# Patient Record
Sex: FEMALE | URBAN - METROPOLITAN AREA
[De-identification: names, ages, dates, MRNs, and addresses within clinical notes are randomized per-mention and may not be internally consistent; named-entity substitution may affect disease eponyms.]

---

## 2022-12-22 ENCOUNTER — OFFICE VISIT (OUTPATIENT)
Dept: PRIMARY CARE CLINIC | Age: 27
End: 2022-12-22
Payer: COMMERCIAL

## 2022-12-22 VITALS
BODY MASS INDEX: 27.89 KG/M2 | RESPIRATION RATE: 12 BRPM | HEIGHT: 68 IN | DIASTOLIC BLOOD PRESSURE: 78 MMHG | WEIGHT: 184 LBS | TEMPERATURE: 98 F | OXYGEN SATURATION: 97 % | HEART RATE: 112 BPM | SYSTOLIC BLOOD PRESSURE: 106 MMHG

## 2022-12-22 DIAGNOSIS — Z00.00 ENCOUNTER FOR ANNUAL PHYSICAL EXAM: Primary | ICD-10-CM

## 2022-12-22 DIAGNOSIS — N92.0 MENORRHAGIA WITH REGULAR CYCLE: ICD-10-CM

## 2022-12-22 DIAGNOSIS — Z00.00 ENCOUNTER FOR ANNUAL PHYSICAL EXAM: ICD-10-CM

## 2022-12-22 DIAGNOSIS — F33.1 MODERATE EPISODE OF RECURRENT MAJOR DEPRESSIVE DISORDER (HCC): ICD-10-CM

## 2022-12-22 LAB
A/G RATIO: 1.4 (ref 1.1–2.2)
ALBUMIN SERPL-MCNC: 4 G/DL (ref 3.4–5)
ALP BLD-CCNC: 65 U/L (ref 40–129)
ALT SERPL-CCNC: 10 U/L (ref 10–40)
ANION GAP SERPL CALCULATED.3IONS-SCNC: 11 MMOL/L (ref 3–16)
AST SERPL-CCNC: 15 U/L (ref 15–37)
BASOPHILS ABSOLUTE: 0 K/UL (ref 0–0.2)
BASOPHILS RELATIVE PERCENT: 0.3 %
BILIRUB SERPL-MCNC: 0.3 MG/DL (ref 0–1)
BUN BLDV-MCNC: 12 MG/DL (ref 7–20)
CALCIUM SERPL-MCNC: 9.6 MG/DL (ref 8.3–10.6)
CHLORIDE BLD-SCNC: 101 MMOL/L (ref 99–110)
CHOLESTEROL, TOTAL: 160 MG/DL (ref 0–199)
CO2: 23 MMOL/L (ref 21–32)
CREAT SERPL-MCNC: 0.7 MG/DL (ref 0.6–1.1)
EOSINOPHILS ABSOLUTE: 0.1 K/UL (ref 0–0.6)
EOSINOPHILS RELATIVE PERCENT: 1 %
FERRITIN: 24.3 NG/ML (ref 15–150)
GFR SERPL CREATININE-BSD FRML MDRD: >60 ML/MIN/{1.73_M2}
GLUCOSE BLD-MCNC: 90 MG/DL (ref 70–99)
HCT VFR BLD CALC: 40.5 % (ref 36–48)
HDLC SERPL-MCNC: 43 MG/DL (ref 40–60)
HEMOGLOBIN: 13.5 G/DL (ref 12–16)
HEPATITIS C ANTIBODY INTERPRETATION: NORMAL
IRON SATURATION: 24 % (ref 15–50)
IRON: 70 UG/DL (ref 37–145)
LDL CHOLESTEROL CALCULATED: 102 MG/DL
LYMPHOCYTES ABSOLUTE: 1.8 K/UL (ref 1–5.1)
LYMPHOCYTES RELATIVE PERCENT: 23.7 %
MCH RBC QN AUTO: 29.3 PG (ref 26–34)
MCHC RBC AUTO-ENTMCNC: 33.4 G/DL (ref 31–36)
MCV RBC AUTO: 87.7 FL (ref 80–100)
MONOCYTES ABSOLUTE: 0.4 K/UL (ref 0–1.3)
MONOCYTES RELATIVE PERCENT: 6 %
NEUTROPHILS ABSOLUTE: 5.2 K/UL (ref 1.7–7.7)
NEUTROPHILS RELATIVE PERCENT: 69 %
PDW BLD-RTO: 13.2 % (ref 12.4–15.4)
PLATELET # BLD: 265 K/UL (ref 135–450)
PMV BLD AUTO: 8.8 FL (ref 5–10.5)
POTASSIUM SERPL-SCNC: 4 MMOL/L (ref 3.5–5.1)
RBC # BLD: 4.61 M/UL (ref 4–5.2)
SODIUM BLD-SCNC: 135 MMOL/L (ref 136–145)
T4 FREE: 1.1 NG/DL (ref 0.9–1.8)
TOTAL IRON BINDING CAPACITY: 290 UG/DL (ref 260–445)
TOTAL PROTEIN: 6.8 G/DL (ref 6.4–8.2)
TRIGL SERPL-MCNC: 75 MG/DL (ref 0–150)
TSH SERPL DL<=0.05 MIU/L-ACNC: 2.15 UIU/ML (ref 0.27–4.2)
VLDLC SERPL CALC-MCNC: 15 MG/DL
WBC # BLD: 7.5 K/UL (ref 4–11)

## 2022-12-22 PROCEDURE — 99385 PREV VISIT NEW AGE 18-39: CPT | Performed by: STUDENT IN AN ORGANIZED HEALTH CARE EDUCATION/TRAINING PROGRAM

## 2022-12-22 RX ORDER — TRAZODONE HYDROCHLORIDE 150 MG/1
150 TABLET ORAL NIGHTLY
Qty: 90 TABLET | Refills: 1 | Status: SHIPPED | OUTPATIENT
Start: 2022-12-22

## 2022-12-22 RX ORDER — ESCITALOPRAM OXALATE 10 MG/1
10 TABLET ORAL DAILY
Qty: 90 TABLET | Refills: 1 | Status: SHIPPED | OUTPATIENT
Start: 2022-12-22

## 2022-12-22 SDOH — ECONOMIC STABILITY: FOOD INSECURITY: WITHIN THE PAST 12 MONTHS, YOU WORRIED THAT YOUR FOOD WOULD RUN OUT BEFORE YOU GOT MONEY TO BUY MORE.: NEVER TRUE

## 2022-12-22 SDOH — ECONOMIC STABILITY: FOOD INSECURITY: WITHIN THE PAST 12 MONTHS, THE FOOD YOU BOUGHT JUST DIDN'T LAST AND YOU DIDN'T HAVE MONEY TO GET MORE.: NEVER TRUE

## 2022-12-22 ASSESSMENT — PATIENT HEALTH QUESTIONNAIRE - PHQ9
SUM OF ALL RESPONSES TO PHQ QUESTIONS 1-9: 2
1. LITTLE INTEREST OR PLEASURE IN DOING THINGS: 1
SUM OF ALL RESPONSES TO PHQ QUESTIONS 1-9: 2
2. FEELING DOWN, DEPRESSED OR HOPELESS: 1
SUM OF ALL RESPONSES TO PHQ9 QUESTIONS 1 & 2: 2
SUM OF ALL RESPONSES TO PHQ QUESTIONS 1-9: 2
SUM OF ALL RESPONSES TO PHQ QUESTIONS 1-9: 2

## 2022-12-22 ASSESSMENT — SOCIAL DETERMINANTS OF HEALTH (SDOH): HOW HARD IS IT FOR YOU TO PAY FOR THE VERY BASICS LIKE FOOD, HOUSING, MEDICAL CARE, AND HEATING?: NOT HARD AT ALL

## 2022-12-22 NOTE — PROGRESS NOTES
2022    Nuha Chilel (:  1995) is a 33 y.o. female, here for   Chief Complaint   Patient presents with    Anxiety    Depression     Fam History: silent stroke and stomach cancer mother    Anxiety and depression  - started in highschool  - has had suicide attempts last one was   - no alcohol or drugs  - last hospitalized 3 weeks ago for depression  - work and family stress, she is the eldest and has a lot of responsibility, feels like she is , bt mom and dad who are , not a great relationship with little sister, mom tells her she is not doing enough for the family    Has therapist goes one a week  Recently got out of hospital      Feels emotionless, and having problem with \"reality\"  feels like she is here but not really, thought she was dreaming, depressive episodes will last for hours to 3 days, will sleep ok but was over sleeping, lexapro has helped her give her more energy, feels fatigued all the time, no SHORTNESS OF BREATH    Mood today kind of feels on edge     Has nexplanon but having heavy periods     There is no problem list on file for this patient. Review of Systems   All other systems reviewed and are negative. Prior to Visit Medications    Medication Sig Taking? Authorizing Provider   escitalopram (LEXAPRO) 10 MG tablet Take 1 tablet by mouth daily Yes Anat Soto MD   traZODone (DESYREL) 150 MG tablet Take 1 tablet by mouth nightly Yes Anat Soto MD        No Known Allergies    No past medical history on file.     Past Surgical History:   Procedure Laterality Date    ANTERIOR CRUCIATE LIGAMENT REPAIR Bilateral        Social History     Socioeconomic History    Marital status: Single     Spouse name: Not on file    Number of children: Not on file    Years of education: Not on file    Highest education level: Not on file   Occupational History    Not on file   Tobacco Use    Smoking status: Never     Passive exposure: Never    Smokeless tobacco: Never   Substance and Sexual Activity    Alcohol use: Never    Drug use: Never    Sexual activity: Not on file   Other Topics Concern    Not on file   Social History Narrative    Not on file     Social Determinants of Health     Financial Resource Strain: Low Risk     Difficulty of Paying Living Expenses: Not hard at all   Food Insecurity: No Food Insecurity    Worried About Running Out of Food in the Last Year: Never true    Ran Out of Food in the Last Year: Never true   Transportation Needs: Not on file   Physical Activity: Not on file   Stress: Not on file   Social Connections: Not on file   Intimate Partner Violence: Not on file   Housing Stability: Not on file        Family History   Problem Relation Age of Onset    Stroke Mother        ADVANCE DIRECTIVE: N, <no information>    Vitals:    12/22/22 0814   BP: 106/78   Site: Left Upper Arm   Position: Sitting   Cuff Size: Small Adult   Pulse: (!) 112   Resp: 12   Temp: 98 °F (36.7 °C)   TempSrc: Temporal   SpO2: 97%   Weight: 184 lb (83.5 kg)   Height: 5' 8\" (1.727 m)     Estimated body mass index is 27.98 kg/m² as calculated from the following:    Height as of this encounter: 5' 8\" (1.727 m). Weight as of this encounter: 184 lb (83.5 kg). Physical Exam  Vitals reviewed. Constitutional:       General: She is not in acute distress. Appearance: Normal appearance. She is not ill-appearing. HENT:      Head: Normocephalic and atraumatic. Right Ear: Tympanic membrane, ear canal and external ear normal.      Left Ear: Tympanic membrane, ear canal and external ear normal.      Nose: Nose normal. No rhinorrhea. Mouth/Throat:      Mouth: Mucous membranes are moist.      Pharynx: Oropharynx is clear. No oropharyngeal exudate. Eyes:      General: No scleral icterus. Right eye: No discharge. Left eye: No discharge. Extraocular Movements: Extraocular movements intact.       Conjunctiva/sclera: Conjunctivae normal.   Cardiovascular: Rate and Rhythm: Normal rate and regular rhythm. Pulses: Normal pulses. Heart sounds: Normal heart sounds. No murmur heard. No gallop. Pulmonary:      Effort: Pulmonary effort is normal.      Breath sounds: Normal breath sounds. No wheezing, rhonchi or rales. Abdominal:      General: Abdomen is flat. Bowel sounds are normal. There is no distension. Palpations: Abdomen is soft. There is no mass. Musculoskeletal:         General: Normal range of motion. Cervical back: Neck supple. No muscular tenderness. Lymphadenopathy:      Cervical: No cervical adenopathy. Skin:     General: Skin is warm. Capillary Refill: Capillary refill takes less than 2 seconds. Findings: No rash. Neurological:      General: No focal deficit present. Mental Status: She is alert. Cranial Nerves: No cranial nerve deficit. Psychiatric:         Mood and Affect: Mood normal.         Behavior: Behavior normal.       No flowsheet data found. No results found for: CHOL, CHOLFAST, TRIG, TRIGLYCFAST, HDL, LDLCHOLESTEROL, LDLCALC, GLUF, GLUCOSE, LABA1C    The ASCVD Risk score (Gregorio DK, et al., 2019) failed to calculate for the following reasons:     The 2019 ASCVD risk score is only valid for ages 36 to 78    Immunization History   Administered Date(s) Administered    COVID-19, J&J, (age 18y+), IM, 0.5 mL 04/02/2021    Influenza, FLUARIX, FLULAVAL, FLUZONE (age 10 mo+) AND AFLURIA, (age 1 y+), PF, 0.5mL 03/22/2019, 01/18/2022    Influenza, Triv, 3 Years and older, IM (Afluria (5 yrs and older) 11/29/2022       Health Maintenance   Topic Date Due    Varicella vaccine (1 of 2 - 2-dose childhood series) Never done    HPV vaccine (1 - 2-dose series) Never done    Depression Screen  Never done    HIV screen  Never done    Hepatitis C screen  Never done    DTaP/Tdap/Td vaccine (1 - Tdap) Never done    Pap smear  Never done    COVID-19 Vaccine (2 - Booster for Marilin series) 05/28/2021    Flu vaccine  Completed    Hepatitis A vaccine  Aged Out    Hib vaccine  Aged Out    Meningococcal (ACWY) vaccine  Aged Out    Pneumococcal 0-64 years Vaccine  Aged Out       Assessment & Plan   Encounter for annual physical exam  -     CBC with Auto Differential; Future  -     Comprehensive Metabolic Panel; Future  -     Lipid Panel; Future  -     HIV Screen; Future  -     TSH; Future  -     T4, Free; Future  -     Ferritin; Future  -     Iron and TIBC; Future  -     Hepatitis C Antibody; Future  -     [facility]  General wellness exam. Reviewed chart for past hx and updated today. Counseled on age appropriate health guidance and discussed screening recommendations. Vaccinations reviewed and discussed. All questions answered    Moderate episode of recurrent major depressive disorder ([address]  Cont with therapy   Refill lexapro and trazodone  F/y psychiatry  -     [facility]  Menorrhagia with regular cycle  -     CBC with Auto Differential; Future  -     TSH; Future  -     T4, Free; Future  -     Ferritin; Future  -     Iron and TIBC; Future  -     Kurt Vazquez MD, Gynecology, [facility]  Has nexplanon but interested in mirena  Likely has JUNIOR which can make depression worse    Return in about 6 months (around 6/22/2023), or if symptoms worsen or fail to improve.          --Aubree Condon MD

## 2022-12-23 ENCOUNTER — CARE COORDINATION (OUTPATIENT)
Dept: OTHER | Facility: CLINIC | Age: 27
End: 2022-12-23

## 2022-12-23 LAB
HIV AG/AB: NORMAL
HIV ANTIGEN: NORMAL
HIV-1 ANTIBODY: NORMAL
HIV-2 AB: NORMAL

## 2022-12-23 NOTE — CARE COORDINATION
[address] Care Transitions Follow Up Call    Care Transition Nurse contacted the patient by telephone to follow up after admission on 22. Verified name and  with patient as identifiers. Patient: Juventino Velasco  Patient : 1995   MRN: [id number]  Reason for Admission: Behavioral health  Discharge Date:   RARS: No data recorded    Needs to be reviewed by the provider   Additional needs identified to be addressed with provider: No  none             Method of communication with provider: none. Addressed changes since last contact:  none  Discussed follow-up appointments. If no appointment was previously scheduled, appointment scheduling offered: Yes. Is follow up appointment scheduled within 7 days of discharge? No.  Patient had a new patient follow up for PCP on . She was referred for psychiatric follow up by her PCP to [facility]. This ACM verified in network status. Patient will call and schedule an appointment at her earliest convenience. Patient has been seeing a therapist for 3 weeks, Breann Hicks at [facility]. She is seeing him weekly and has her next appointment on . Follow Up  No future appointments. Non-[facility] follow up appointment(s): None    Care Transition Nurse reviewed discharge instructions with patient and discussed any barriers to care and/or understanding of plan of care after discharge. Discussed appropriate site of care based on symptoms and resources available to patient including: Specialist. The patient agrees to contact the PCP office for questions related to their healthcare. Advance Care Planning:   not on file; education provided.      Patients top risk factors for readmission: depression and ineffective coping  Interventions to address risk factors: Obtained and reviewed discharge summary and/or continuity of care documents    Offered patient enrollment in the Remote Patient Monitoring (RPM) program for in-home monitoring: Patient is not eligible for RPM program.     Care Transitions Subsequent and Final Call    Subsequent and Final Calls  Care Transitions Interventions  Other Interventions:             Care Transition Nurse provided contact information for future needs. Plan for follow-up call in 10-14 days based on severity of symptoms and risk factors. Plan for next call: follow-up appointment-will discuss upcoming scheduled appointment with a psychiatrist at [facility].      Carley Wade RN

## 2023-01-04 ENCOUNTER — TELEPHONE (OUTPATIENT)
Dept: PRIMARY CARE CLINIC | Age: 28
End: 2023-01-04

## 2023-01-04 NOTE — TELEPHONE ENCOUNTER
Patient called and enquired about the fax received from [employer] which has been sent     To Dr Whitney Baires today.     Please review    Her call back lf--[phone]    Thanks

## 2023-01-04 NOTE — TELEPHONE ENCOUNTER
Lmtcb please inform patient all the paperwork that was faxed to us was incomplete and we need pages re faxed tried to contact [employer] and never got through

## 2023-01-06 ENCOUNTER — CARE COORDINATION (OUTPATIENT)
Dept: OTHER | Facility: CLINIC | Age: 28
End: 2023-01-06

## 2023-01-06 NOTE — CARE COORDINATION
ACM attempted to reach patient for Care Transition follow up and introduction to Associate Care Management. HIPAA compliant message left requesting a return phone call. Will attempt to outreach patient again.

## 2023-01-12 ENCOUNTER — CARE COORDINATION (OUTPATIENT)
Dept: OTHER | Facility: CLINIC | Age: 28
End: 2023-01-12

## 2023-01-12 NOTE — CARE COORDINATION
Ambulatory Care Coordination Note  1/12/2023    Ambulatory Care Management Note    Date/Time:  1/12/2023 4:16 PM    This patient was received as a referral from Daily assignment. Ambulatory Care Manager outreached to patient today to offer care management services. Introduction to self and role of care manager provided. Patient accepted care management services at this time. Follow up call scheduled at this time. Patient has Ambulatory Care Manager's contact number for for any questions or concerns. Patient states that she is still struggling a bit with her emotions, feeling numb often. She states that she is still anxious and worried about her STD coming through. She is back at work, but needing back pay for the time she was out. Patient states that she has been taking Lexapro for about 6 weeks, not seeing a great deal of improvement. Her PCP referred to a psychiatrist at [facility], however due to her work schedule, she has been unable to get in for an appointment. She has received enough refills from her PCP to last until she is able to get an appointment. She struggles to get an appointment because she has no PTO to use. This ACM suggested trying [facility], to see if she could get the medication management, at least until she is able to build up some time. She sees Thereharlan Little for therapy and would like to continue seeing him. Her last appointment was two weeks, she plans to attempt to schedule something soon. This ACM encouraged her to have [employer] send her papers directly to her and then for her to send them to the 's office, as she is being told that they are getting lost.   Will send the patient information regarding [facility], via email at her request. Will continue to follow the patient.      Ambulatory Care Coordination Assessment    Care Coordination Protocol  Referral from Primary Care Provider: No  Week 1 - Initial Assessment     Do you have all of your prescriptions and are they filled?: Yes  Are you able to afford your medications?: Yes  How often do you have trouble taking your medications the way you have been told to take them?: I always take them as prescribed. Current Housing: Private Residence                 Suggested Interventions and Community Resources                  Prior to Admission medications    Medication Sig Start Date End Date Taking? Authorizing Provider   escitalopram (LEXAPRO) 10 MG tablet Take 1 tablet by mouth daily 12/22/22   Debbie Mota MD   traZODone (DESYREL) 150 MG tablet Take 1 tablet by mouth nightly 12/22/22   Debbie Mota MD       No future appointments.

## 2023-01-16 ENCOUNTER — TELEMEDICINE (OUTPATIENT)
Dept: PRIMARY CARE CLINIC | Age: 28
End: 2023-01-16
Payer: COMMERCIAL

## 2023-01-16 DIAGNOSIS — F41.8 DEPRESSION WITH ANXIETY: Primary | ICD-10-CM

## 2023-01-16 PROCEDURE — 99214 OFFICE O/P EST MOD 30 MIN: CPT | Performed by: STUDENT IN AN ORGANIZED HEALTH CARE EDUCATION/TRAINING PROGRAM

## 2023-01-16 RX ORDER — ESCITALOPRAM OXALATE 20 MG/1
20 TABLET ORAL DAILY
Qty: 90 TABLET | Refills: 3 | Status: SHIPPED | OUTPATIENT
Start: 2023-01-16

## 2023-01-16 NOTE — PROGRESS NOTES
Peace Solis (:  1995) is a Established patient, here for evaluation of the following:    Assessment & Plan   Below is the assessment and plan developed based on review of pertinent history, physical exam, labs, studies, and medications. 1. Depression with anxiety  -     escitalopram (LEXAPRO) 20 MG tablet; Take 1 tablet by mouth daily, Disp-90 tablet, R-3Normal    Depression anxiety not well controlled, because of not being able to take off of work for her appointments she has not yet scheduled another therapy session. I do believe that it is imperative for her to be able to see her therapist at least once weekly to better treat her anxiety and depression, improve her overall wellbeing, and prevent another admission into the hospital.  After starting Lexapro her mood has improved but still having a lot of panic attacks. - Increase Lexapro to 20 mg daily follow-up in 1 month  - I filled out her paperwork for accommodations so that she may be able to go to her therapy sessions and follow-up visits with me. See media    Return in about 4 weeks (around 2023), or if symptoms worsen or fail to improve. Subjective   HPI    Was in hospital, see clinic note form 22 for depression    Depression is better but feels she is still \"on edge\" and feeling numb as well. Going to therapy once a week 1 hour visits, so far is going well, will feel anxious all of a sudden out of nowhere and will have panic attacks, somewhat better after starting lexapro     Review of Systems   All other systems reviewed and are negative. Objective   Patient-Reported Vitals  No data recorded     Physical Exam  Vitals reviewed. Constitutional:       General: She is not in acute distress. Appearance: Normal appearance. She is not ill-appearing. HENT:      Head: Normocephalic and atraumatic. Right Ear: External ear normal.      Left Ear: External ear normal.   Eyes:      General: No scleral icterus. Right eye: No discharge. Left eye: No discharge. Extraocular Movements: Extraocular movements intact. Conjunctiva/sclera: Conjunctivae normal.   Pulmonary:      Effort: Pulmonary effort is normal. No respiratory distress. Musculoskeletal:      Cervical back: Neck supple. Skin:     Coloration: Skin is not jaundiced. Findings: No lesion or rash. Neurological:      Mental Status: She is alert. Cranial Nerves: No cranial nerve deficit. Coordination: Coordination normal.   Psychiatric:         Mood and Affect: Mood normal.                Ese Torres, was evaluated through a synchronous (real-time) audio-video encounter. The patient (or guardian if applicable) is aware that this is a billable service, which includes applicable co-pays. This Virtual Visit was conducted with patient's (and/or legal guardian's) consent. The visit was conducted pursuant to the emergency declaration under the [facility], [facility] authority and the [employer] General Act. Patient identification was verified, and a caregiver was present when appropriate. The patient was located at Home: [address] [place] [address]. Provider was located at [facility] (Appt Dept): [address]  [facility].         --Meghan Fagan MD

## 2023-01-24 ENCOUNTER — CARE COORDINATION (OUTPATIENT)
Dept: OTHER | Facility: CLINIC | Age: 28
End: 2023-01-24

## 2023-01-24 NOTE — CARE COORDINATION
Ambulatory Care Coordination Note  1/24/2023    Telephonic outreach to follow up with the patient. Verified two patient identifiers. Patient had a recent birthday, states that she had a nice dinner with her boyfriend. She is pleased that her concerns regarding her leave have been moving forward. She is waiting to get approval for intermittent FMLA and final STD with [employer].     patient reports the following symptoms of anxiety: chest pain, racing thoughts, and feelings of apprehension/worry. Symptoms are reported as has moderately improved since previous ACM contact. Is patient currently undergoing treatment? Yes Patient plans to start therapy weekly again    Patient states that she has better control of her emotions. She had mentioned in a previous conversation that she was feeling numb. She was pleased that she was able to cry for the first time in a while. [facility] preformed the following medication review: All medications are filled Yes Patient recently had her Lexapro increased from 10mg to 20mg. She is having a few side effects since the increase, to include: GI upset, slight H/A, and sweating at night while sleeping. Discussed how these symptoms should subside as her body gets used to the medication. Encouraged her to notify her provider if the symptoms do not improve in the next couple of weeks. Patient feels like the medication has been effective for her moods and her anxiety. She states that at night it is hard to shut her brain off, however taking the Trazadone has been very helpful. Will continue to outreach the patient and offer assistance as needed. Lab Results       None            Care Coordination Interventions    Referral from Primary Care Provider: No  Suggested Interventions and Community Resources          Goals Addressed    None         Prior to Admission medications    Medication Sig Start Date End Date Taking?  Authorizing Provider   escitalopram (LEXAPRO) 20 MG tablet Take 1 tablet by mouth daily 1/16/23   Dianne Covarrubias MD   traZODone (DESYREL) 150 MG tablet Take 1 tablet by mouth nightly 12/22/22   Dianne Covarrubias MD       No future appointments.

## 2023-02-10 ENCOUNTER — CARE COORDINATION (OUTPATIENT)
Dept: OTHER | Facility: CLINIC | Age: 28
End: 2023-02-10

## 2023-02-20 ENCOUNTER — TELEPHONE (OUTPATIENT)
Dept: GYNECOLOGY | Age: 28
End: 2023-02-20

## 2023-02-20 ENCOUNTER — OFFICE VISIT (OUTPATIENT)
Dept: GYNECOLOGY | Age: 28
End: 2023-02-20
Payer: COMMERCIAL

## 2023-02-20 VITALS
OXYGEN SATURATION: 97 % | HEART RATE: 78 BPM | DIASTOLIC BLOOD PRESSURE: 74 MMHG | RESPIRATION RATE: 12 BRPM | TEMPERATURE: 97.6 F | SYSTOLIC BLOOD PRESSURE: 122 MMHG | WEIGHT: 177.4 LBS | BODY MASS INDEX: 26.97 KG/M2

## 2023-02-20 DIAGNOSIS — Z01.419 WELL WOMAN EXAM WITH ROUTINE GYNECOLOGICAL EXAM: Primary | ICD-10-CM

## 2023-02-20 DIAGNOSIS — Z11.3 SCREEN FOR STD (SEXUALLY TRANSMITTED DISEASE): ICD-10-CM

## 2023-02-20 DIAGNOSIS — N92.0 MENORRHAGIA WITH REGULAR CYCLE: ICD-10-CM

## 2023-02-20 PROCEDURE — 99385 PREV VISIT NEW AGE 18-39: CPT | Performed by: OBSTETRICS & GYNECOLOGY

## 2023-02-20 RX ORDER — PROMETHAZINE HYDROCHLORIDE 25 MG/1
TABLET ORAL
Qty: 2 TABLET | Refills: 0 | Status: SHIPPED | OUTPATIENT
Start: 2023-02-20

## 2023-02-20 RX ORDER — OXYCODONE HYDROCHLORIDE AND ACETAMINOPHEN 5; 325 MG/1; MG/1
2 TABLET ORAL ONCE
Qty: 2 TABLET | Refills: 0 | Status: SHIPPED | OUTPATIENT
Start: 2023-02-20 | End: 2023-02-20

## 2023-02-20 RX ORDER — DIAZEPAM 10 MG/1
TABLET ORAL
Qty: 1 TABLET | Refills: 0 | Status: SHIPPED | OUTPATIENT
Start: 2023-02-20 | End: 2023-02-20

## 2023-02-20 NOTE — LETTER
[facility]  [place] [id number] [place] [address]  Phone: [phone]  Fax: [phone]           Brandon Espinoza MD      2023     Patient: Rayna Tirado   MR Number: [id number]   YOB: 1995   Date of Visit: 2023       Dear Dr. Jazmine Sue: Thank you for referring Rayna Tirdao to me for evaluation/treatment. Below are the relevant portions of my assessment and plan of care. Mike Linares was seen today for well woman gynecologic care and Pap smear. STI testing was also sent. The patient has a  Nexplanon. She would like a Mirena IUD. We are ordering this for the patient and will get it placed once it arrives. She will also be provided with oral sedation. If you have questions, please do not hesitate to call me. I look forward to following Mike Linares along with you.     Sincerely,        Brandon Espinoza MD    CC providers:  Lennie Sutherland MD  [facility] [address] [facility] [address]  [address] [employer]

## 2023-02-20 NOTE — PROGRESS NOTES
SUBJECTIVE:  Nuha Chilel is an 33y.o. year old woman who presents for annual gyn exam.    The patient has a Nexplanon in place. It was placed in 2019. This is her second when having had the first 1 placed in 2016. She would like a IUD. She complains of heavy menorrhagia with Nexplanon. IUD options reviewed and wants a Mirena IUD. Discussed risk of use and insertion. Acceptable to patient. Mirena IUD is ordered for her. We will go ahead and take out her Nexplanon in place for Mirena IUD at the same day. Patient does note that she has some anxiety and we discussed premedication/oral sedation. She is aware that someone will need to drive her to and from the appointment. The patient has not had a annual exam/Pap smear or infection checks in several years. She has a history of chlamydia. She is offered and accepted Pap smear and STI testing. See orders. REVIEW OF SYSTEMS:  No complaints of symptoms involving:  Constitutional: there has been no unanticipated weight loss. There's been no change in activity level. Negative for fever, chills. Eyes: No visual changes, double vision, or scotomata. No scleral icterus. HENT: No Headaches, hearing loss or vertigo. No sore throat, ear pain or nasal congestion  Respiratory: no cough or wheezing, no sputum production, no hemoptysis. Gastrointestinal: No abdominal pain, appetite loss, blood in stools. No change in bowel habits. Genitourinary: No dysuria, trouble voiding, or hematuria. No change in bladder habits. Musculoskeletal:  No gait disturbance,no weakness or joint complaints. Skin: No rash or pruritis. Neurological: No headache, vision changes, change in muscle strength, numbness or tingling. No change in gait, balance, coordination. Psychiatric: No anxiety, or depression. No change in mood or behavior. Endocrine: No temperature intolerance. No excessive thirst, fluid intake, or urination. No tremor.   Hematologic/Lymphatic: No abnormal bruising or bleeding, blood clots or swollen lymph nodes. There is no problem list on file for this patient. Current Outpatient Medications   Medication Sig Dispense Refill    escitalopram (LEXAPRO) 20 MG tablet Take 1 tablet by mouth daily 90 tablet 3    traZODone (DESYREL) 150 MG tablet Take 1 tablet by mouth nightly 90 tablet 1     No current facility-administered medications for this visit. Past Medical History:   Diagnosis Date    Chlamydia 2019    Nexplanon in place 2019 2016 and 2019     Past Surgical History:   Procedure Laterality Date    ANTERIOR CRUCIATE LIGAMENT REPAIR Bilateral      Social History     Tobacco Use    Smoking status: Never     Passive exposure: Never    Smokeless tobacco: Never   Substance Use Topics    Alcohol use: Never     OB History    No obstetric history on file. Obstetric Comments   G0             Family History   Problem Relation Age of Onset    Stroke Mother        OBJECTIVE:  /74   Pulse 78   Temp 97.6 °F (36.4 °C) (Temporal)   Resp 12   Wt 177 lb 6.4 oz (80.5 kg)   LMP  (Exact Date)   SpO2 97%   BMI 26.97 kg/m²   Body mass index is 26.97 kg/m². General appearance: alert,calm,pleasant, no acute distress, non-toxic  Skin:  no lesions,no rashes  Neck: no thyromegaly,no adenopathy,no masses  Abdominal: Abdomen soft, non-tender. No rebound or guarding. No masses, organomegaly  Breasts: Inspection negative. No skin changes such as dipples, edema, or retractions. No nipple discharge or bleeding. No mass palpated. Non tender. No supraclavicular, infraclavicular, or axillary lymphadenopathy  Genitourinary:  Vulva:  no external lesions,normal hair distribution,no inguinal adenopathy  Vagina:  moist,pink,well rugated,no discharge  Bladder without mass, nontender. Urethra, and Urethral meatus grossly normal without mass and nontender  Cervix:  smooth,pink,no lesions.   Uterus:  normal size, shape and consistency,mobile,nontender  Adnexa:  no masses,no tenderness  Rectum/anus:  no external hemorrhoids,no lesions  History and Examination chaperoned by Medical Assistant    ASSESSMENT AND PLAN:   Diagnosis Orders   1. Well woman exam with routine gynecological exam  PAP SMEAR    C.trachomatis N.gonorrhoeae DNA, Thin Prep    VAGINAL PATHOGENS PROBE *A      2. Screen for STD (sexually transmitted disease)  C.trachomatis N.gonorrhoeae DNA, Thin Prep    VAGINAL PATHOGENS PROBE *A      3. Menorrhagia with regular cycle  oxyCODONE-acetaminophen (PERCOCET) 5-325 MG per tablet    diazePAM (VALIUM) 10 MG tablet        Follow-up for Mirena IUD insertion and Nexplanon removal.  Oral sedation for procedures.     SBE monthly and return annually or prn  Explained current pap smear guidelines  Patient counseling:  SBE demonstrated

## 2023-02-20 NOTE — TELEPHONE ENCOUNTER
IUD Mirena order sent to [facility]. Waiting on order to be shipped    Phone: [phone]  Fax: [phone]

## 2023-02-21 LAB
REASON FOR REJECTION: NORMAL
REJECTED TEST: NORMAL

## 2023-02-22 LAB
HPV COMMENT: NORMAL
HPV TYPE 16: NOT DETECTED
HPV TYPE 18: NOT DETECTED
HPVOH (OTHER TYPES): NOT DETECTED

## 2023-02-23 LAB
C. TRACHOMATIS DNA,THIN PREP: NEGATIVE
N. GONORRHOEAE DNA, THIN PREP: NEGATIVE

## 2023-03-01 ENCOUNTER — PROCEDURE VISIT (OUTPATIENT)
Dept: GYNECOLOGY | Age: 28
End: 2023-03-01
Payer: COMMERCIAL

## 2023-03-01 VITALS
WEIGHT: 176 LBS | OXYGEN SATURATION: 99 % | DIASTOLIC BLOOD PRESSURE: 72 MMHG | SYSTOLIC BLOOD PRESSURE: 118 MMHG | HEART RATE: 102 BPM | BODY MASS INDEX: 26.76 KG/M2

## 2023-03-01 DIAGNOSIS — Z30.430 ENCOUNTER FOR INSERTION OF INTRAUTERINE CONTRACEPTIVE DEVICE (IUD): Primary | ICD-10-CM

## 2023-03-01 PROCEDURE — 58300 INSERT INTRAUTERINE DEVICE: CPT | Performed by: OBSTETRICS & GYNECOLOGY

## 2023-03-01 NOTE — PROGRESS NOTES
Rogelio Loredo is here today for her Nexplanon removal.  See previous notes.  Palpation of the Nexplanon device is very limited secondary to the device being deep.  With pressure on the proximal end of the device, I am able to feel the distal tip deep in the soft tissue.  The area covering the tip of the distal end is also difficult to palpate secondary to dark shading tattoo over this area changing the texture of the skin.  Because of the deep localization of the device, I discussed removing this under ultrasound guidance.  The patient is agreeable and arrangements will be made.    Rogelio Loredo is a 28 y.o. female here today for Mirena intrauterine device (IUD) insertion.  She has been previously counseled.  I again counseled her. Warnings and instructions were given including risks of insertion, including perforation, infection and need for surgery.  The patient is also aware of the risk of IUD failure.  She was educated regarding checking her strings, avoidance of sexually transmitted diseases (STDs), and the anticipated bleeding profile after insertion.  She is able to voice understanding, consents to proceed with IUD insertion.    Review of systems:  No complaints of symptoms involving:  Constitutional: negative for fever, chills.  Eyes: No change in vision, double vision, or scotomata.  HENT: No sore throat, ear pain or nasal congestion.  Respiratory: No cough, shortness of breath or hemoptysis.  Cardiovascular: No chest pain, orthopnea, fainting.  Skin: No pruritus or generalized rash.  Neurologic: No focal weakness or sensory changes.   Gynecologic: regular periods    There is no problem list on file for this patient.    Past Medical History:   Diagnosis Date    Chlamydia 2019    Nexplanon in place 2019 2016 and 2019     Past Surgical History:   Procedure Laterality Date    ANTERIOR CRUCIATE LIGAMENT REPAIR Bilateral      OB History    No obstetric history on file.      Obstetric Comments   G0          No Known Allergies  Current Outpatient Medications   Medication Sig Dispense Refill    promethazine (PHENERGAN) 25 MG tablet Take 2 tablets 60 minutes before procedure. 2 tablet 0    escitalopram (LEXAPRO) 20 MG tablet Take 1 tablet by mouth daily 90 tablet 3    traZODone (DESYREL) 150 MG tablet Take 1 tablet by mouth nightly 90 tablet 1     No current facility-administered medications for this visit. Social History     Socioeconomic History    Marital status: Single     Spouse name: Not on file    Number of children: Not on file    Years of education: Not on file    Highest education level: Not on file   Occupational History    Not on file   Tobacco Use    Smoking status: Never     Passive exposure: Never    Smokeless tobacco: Never   Substance and Sexual Activity    Alcohol use: Never    Drug use: Never    Sexual activity: Not on file   Other Topics Concern    Not on file   Social History Narrative    Not on file     Social Determinants of Health     Financial Resource Strain: Low Risk     Difficulty of Paying Living Expenses: Not hard at all   Food Insecurity: No Food Insecurity    Worried About Running Out of Food in the Last Year: Never true    Ran Out of Food in the Last Year: Never true   Transportation Needs: Not on file   Physical Activity: Not on file   Stress: Not on file   Social Connections: Not on file   Intimate Partner Violence: Not on file   Housing Stability: Not on file     Family History   Problem Relation Age of Onset    Stroke Mother      OBJECTIVE:  /72 (Site: Left Upper Arm, Position: Sitting, Cuff Size: Medium Adult)   Pulse (!) 102   Wt 176 lb (79.8 kg)   SpO2 99%   BMI 26.76 kg/m²   No LMP recorded. Patient has had an implant. General appearance: alert, no acute distress, non-toxic, normal respiratory effort  HEENT: Sclera are clear and non icteric. MOOD and AFFECT: Appropriate,calm. JUDGEMENT and INSIGHT: Intact.   Skin:  no lesions,no rashes  Abdominal: Abdomen soft, non-tender. No masses, organomegaly, hernia  Vulva:  no external lesions,normal hair distribution,no inguinal adenopathy  Vagina:  moist,pink,well rugated,no discharge  Bladder, Urethra, and Urethral meatus grossly normal without mass, nontender. Cervix:  parous,smooth,pink,no lesions. Uterus:  normal size, shape and consistency,mobile,nontender  Adnexa:  no masses,no tenderness  Rectum:  no external hemorrhoids,no lesions  Urine pregnancy test is negative. PROCEDURE:  Pregnancy has been excluded and there are no contraindications to the use of Mirena. We discussed and she understands the contents of the Patient Information Booklet and a signed patient informed consent has been obtained. With the patient comfortably in lithotomy position, a bimanual exam was performed to establish the size, shape and position of the uterus. See above. A speculum was gently inserted into the vagina to visualize the cervix. The cervix and vagina was gently cleansed with antiseptic solution. The anterior lip of the cervix was grasped with a Allis forceps and gently traction was applied to stabilize and align the cervical canal with the uterine cavity. Gentle traction on the cervix was maintained throughout the insertion procedure. A uterine sound was gently inserted to check the patency of the cervix, measure the depth of the uterine cavity in centimeters, confirm cavity direction, and detect evaluate for the presence of uterine anomaly. The Mirena IUD packaging is opened. The handle of the sterile  is remove from the sterile package. The Mirena IUD is loaded into the insertion tube by pushing the slider forward as far as possible in the direction of the arrow thereby moving the insertion tube over the Mirena T-body to load Mirena into the insertion tube. The tips of the arms meet to form a rounded end that extends slightly beyond the insertion tube with its Mirena IUD device loaded in the insertion tube. Holding the slider in this forward position, the upper edge of the flange is moved to correspond to the uterine depth (in centimeters) measured during sounding. Continuing to hold the slider in the forward position, the  is advanced through the cervix until the flange is approximately 1.5-2 cm from the cervix and then insertion is paused. While holding the  steady, the slider is moved down to the mariama to release the arms of Mirena. Ten seconds are allowed to elapse for the horizontal arms to open completely. Once the Mirena IUD arms are allowed to open,  the  is advanced gently towards the fundus of the uterus until the flange touches the cervix. With the Mirena is now in the fundal position, the Mirena is released by moving the slider all the way down. While continuing to hold the slider all the way down the  is slowly and gently withdraw from the uterus. Using a sharp, curved scissor, the threads are perpendicular, leaving about 3 cm visible outside of the cervix. A copy of the Consent Form with lot number is kept for the patient's medical record. The uterus sounded to approximately 7-8 cm. No complications were experienced and the patient was educated regarding checking her strings. She will follow up with me in one week for a post-IUD insertion check or for p.r.n. Reasons. History/patient education and Examination chaperoned by Medical Assistant     Diagnosis Orders   1. Encounter for insertion of intrauterine contraceptive device (IUD)  87569 - VT INSERT INTRAUTERINE DEVICE        We will arrange for Nexplanon removal with ultrasound guidance.

## 2023-03-07 ENCOUNTER — CARE COORDINATION (OUTPATIENT)
Dept: OTHER | Facility: CLINIC | Age: 28
End: 2023-03-07

## 2023-03-07 ENCOUNTER — TELEPHONE (OUTPATIENT)
Dept: GYNECOLOGY | Age: 28
End: 2023-03-07

## 2023-03-07 NOTE — CARE COORDINATION
Ambulatory Care Coordination Note  3/7/2023    Called pt to follow up on progress, reinforce previous education/ provide pt education, and discuss any new issues or concerns. HIPAA compliant message left requesting a return phone call at patients convenience to discuss. Will continue to follow.        Lab Results       None            Care Coordination Interventions    Referral from Primary Care Provider: No  Suggested Interventions and Community Resources          Goals Addressed    None         Future Appointments   Date Time Provider Daniela Aguirre   3/22/2023  8:30 AM MD [facility]

## 2023-03-07 NOTE — TELEPHONE ENCOUNTER
Nexplanon removal needed to be scheduled in the OR room per managment. Scheduled for 03/23/2023. Called insurance company to speak to a rep about getting procedure covered as using an OR for a Nexplanon removal is unusual and I wanted to make sure all bases where covered. Rep informs me that her insurance was deactivated on 03/01/2023. If pt calls back please send her to me. Thank you!

## 2023-03-27 ENCOUNTER — CARE COORDINATION (OUTPATIENT)
Dept: OTHER | Facility: CLINIC | Age: 28
End: 2023-03-27
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
## 2023-08-28 ENCOUNTER — CARE COORDINATION (OUTPATIENT)
Dept: OTHER | Facility: CLINIC | Age: 28
End: 2023-08-28

## 2023-09-01 ENCOUNTER — CARE COORDINATION (OUTPATIENT)
Dept: OTHER | Facility: CLINIC | Age: 28
End: 2023-09-01

## 2023-09-01 NOTE — CARE COORDINATION
Ambulatory Care Coordination Note    ACM attempted to reach patient for care management follow up call regarding [facility] concerns. HIPAA compliant message left requesting a return phone call at patient convenience. Plan for follow-up call in 7-10 days    No future appointments.

## 2023-09-12 ENCOUNTER — CARE COORDINATION (OUTPATIENT)
Dept: OTHER | Facility: CLINIC | Age: 28
End: 2023-09-12

## 2023-09-12 NOTE — CARE COORDINATION
Ambulatory Care Coordination Note    ACM attempted to reach patient for care management follow up call regarding CCM. HIPAA compliant message left requesting a return phone call at patient convenience. Plan for follow-up call in 10-14 days    No future appointments.

## 2023-09-26 ENCOUNTER — CARE COORDINATION (OUTPATIENT)
Dept: OTHER | Facility: CLINIC | Age: 28
End: 2023-09-26

## 2023-09-26 NOTE — CARE COORDINATION
Ambulatory Care Coordination Note    ACM attempted to reach patient for care management follow up call regarding CCM. HIPAA compliant message left requesting a return phone call at patient convenience. Will await a return call/response or will close the episode in two weeks. No future appointments.
hard copy, drawn during this pregnancy

## 2023-10-10 ENCOUNTER — CARE COORDINATION (OUTPATIENT)
Dept: OTHER | Facility: CLINIC | Age: 28
End: 2023-10-10

## 2023-10-10 NOTE — CARE COORDINATION
Ambulatory Care Coordination Note  10/10/2023    Resolving current episode for case management due to patient lost to follow up. Patient has not been reached after repeated calls and letters. Final call made today to attempt to contact and discreet message left on voicemail. Letter sent to patient notifying completion of services due to unable to reach. This writer's contact information and information regarding program services included in materials sent. Goals updated to reflect current status as appropriate.  Will remain available should patient request re-initiation of case management or transitions of care services

## 2024-02-16 DIAGNOSIS — F41.8 DEPRESSION WITH ANXIETY: ICD-10-CM

## 2024-02-16 RX ORDER — TRAZODONE HYDROCHLORIDE 150 MG/1
150 TABLET ORAL NIGHTLY
Qty: 14 TABLET | Refills: 38 | OUTPATIENT
Start: 2024-02-16

## 2024-02-16 RX ORDER — ESCITALOPRAM OXALATE 20 MG/1
20 TABLET ORAL DAILY
Qty: 30 TABLET | Refills: 23 | OUTPATIENT
Start: 2024-02-16

## 2024-02-16 NOTE — TELEPHONE ENCOUNTER
Medication:   Requested Prescriptions     Pending Prescriptions Disp Refills    escitalopram (LEXAPRO) 20 MG tablet [Pharmacy Med Name: ESCITALOPRAM 20 MG TABLET] 30 tablet 23     Sig: TAKE 1 TABLET BY MOUTH EVERY DAY    traZODone (DESYREL) 150 MG tablet [Pharmacy Med Name: TRAZODONE 150 MG TABLET] 14 tablet 38     Sig: TAKE 1 TABLET BY MOUTH NIGHTLY     Last Filled:  escitalopram - 1/16/2023  Trazodone - 12/22/2022    Last appt: 1/16/2023   Next appt: Visit date not found    Last OARRS:        No data to display

## 2024-04-29 ENCOUNTER — TELEPHONE (OUTPATIENT)
Dept: PRIMARY CARE CLINIC | Age: 29
End: 2024-04-29

## 2024-04-29 NOTE — TELEPHONE ENCOUNTER
[place]  [place] [facility] [place]